# Patient Record
Sex: MALE | Race: BLACK OR AFRICAN AMERICAN | NOT HISPANIC OR LATINO | Employment: FULL TIME | URBAN - METROPOLITAN AREA
[De-identification: names, ages, dates, MRNs, and addresses within clinical notes are randomized per-mention and may not be internally consistent; named-entity substitution may affect disease eponyms.]

---

## 2017-09-30 ENCOUNTER — HOSPITAL ENCOUNTER (EMERGENCY)
Facility: HOSPITAL | Age: 42
Discharge: HOME | End: 2017-09-30
Attending: EMERGENCY MEDICINE

## 2017-09-30 VITALS
RESPIRATION RATE: 18 BRPM | BODY MASS INDEX: 29.02 KG/M2 | OXYGEN SATURATION: 100 % | WEIGHT: 170 LBS | HEART RATE: 71 BPM | TEMPERATURE: 98 F | HEIGHT: 64 IN | DIASTOLIC BLOOD PRESSURE: 76 MMHG | SYSTOLIC BLOOD PRESSURE: 146 MMHG

## 2017-09-30 DIAGNOSIS — N34.2 URETHRITIS: Primary | ICD-10-CM

## 2017-09-30 LAB
BACTERIA #/AREA URNS AUTO: ABNORMAL /HPF
BILIRUB UR QL STRIP: NEGATIVE
CLARITY UR REFRACT.AUTO: ABNORMAL
COLOR UR AUTO: ABNORMAL
GLUCOSE UR QL STRIP: NEGATIVE
HGB UR QL STRIP: ABNORMAL
KETONES UR QL STRIP: NEGATIVE
LEUKOCYTE ESTERASE UR QL STRIP: ABNORMAL
MICROSCOPIC COMMENT: ABNORMAL
NITRITE UR QL STRIP: NEGATIVE
PH UR STRIP: 6 [PH] (ref 5–8)
PROT UR QL STRIP: NEGATIVE
RBC #/AREA URNS AUTO: 7 /HPF (ref 0–4)
SP GR UR STRIP: 1.01 (ref 1–1.03)
URN SPEC COLLECT METH UR: ABNORMAL
UROBILINOGEN UR STRIP-ACNC: NEGATIVE EU/DL
WBC #/AREA URNS AUTO: 77 /HPF (ref 0–5)

## 2017-09-30 PROCEDURE — 96372 THER/PROPH/DIAG INJ SC/IM: CPT

## 2017-09-30 PROCEDURE — 87086 URINE CULTURE/COLONY COUNT: CPT

## 2017-09-30 PROCEDURE — 87088 URINE BACTERIA CULTURE: CPT

## 2017-09-30 PROCEDURE — 87077 CULTURE AEROBIC IDENTIFY: CPT

## 2017-09-30 PROCEDURE — 63600175 PHARM REV CODE 636 W HCPCS: Performed by: PHYSICIAN ASSISTANT

## 2017-09-30 PROCEDURE — 87186 SC STD MICRODIL/AGAR DIL: CPT

## 2017-09-30 PROCEDURE — 99283 EMERGENCY DEPT VISIT LOW MDM: CPT | Mod: ,,, | Performed by: EMERGENCY MEDICINE

## 2017-09-30 PROCEDURE — 81001 URINALYSIS AUTO W/SCOPE: CPT

## 2017-09-30 PROCEDURE — 99283 EMERGENCY DEPT VISIT LOW MDM: CPT | Mod: 25

## 2017-09-30 PROCEDURE — 87591 N.GONORRHOEAE DNA AMP PROB: CPT

## 2017-09-30 PROCEDURE — 25000003 PHARM REV CODE 250: Performed by: PHYSICIAN ASSISTANT

## 2017-09-30 RX ORDER — AZITHROMYCIN 250 MG/1
1000 TABLET, FILM COATED ORAL
Status: COMPLETED | OUTPATIENT
Start: 2017-09-30 | End: 2017-09-30

## 2017-09-30 RX ORDER — CEFTRIAXONE 500 MG/1
250 INJECTION, POWDER, FOR SOLUTION INTRAMUSCULAR; INTRAVENOUS
Status: COMPLETED | OUTPATIENT
Start: 2017-09-30 | End: 2017-09-30

## 2017-09-30 RX ADMIN — AZITHROMYCIN 1000 MG: 250 TABLET, FILM COATED ORAL at 04:09

## 2017-09-30 RX ADMIN — CEFTRIAXONE SODIUM 250 MG: 500 INJECTION, POWDER, FOR SOLUTION INTRAMUSCULAR; INTRAVENOUS at 04:09

## 2017-09-30 NOTE — ED PROVIDER NOTES
Encounter Date: 9/30/2017    SCRIBE #1 NOTE: I, Lucille Dent, am scribing for, and in the presence of,  ALONSO Miles. I have scribed the entire note.       History     Chief Complaint   Patient presents with    Exposure to STD     yellow pus leaking from penis x 1 week. Dysuria. Unprotected sex reported.     Time seen by provider: 3:12 PM    This is a 42 y.o. male with PM Hx of UTI who presents with complaint of penile discharge and painful urination x 1 week. He also endorses dysuria particularly after voiding, suprapubic pain, and pain at his right testicle specifically when coughing). No genital sores, fever, chills, nausea, vomiting, diarrhea, CP, SOB, or any other symptoms at this time. Unprotected sex recently but no new partner.         The history is provided by the patient and medical records.     Review of patient's allergies indicates:   Allergen Reactions    Penicillins Swelling     Past Medical History:   Diagnosis Date    UTI (urinary tract infection)      Past Surgical History:   Procedure Laterality Date    CYST REMOVAL      CYST REMOVAL       History reviewed. No pertinent family history.  Social History   Substance Use Topics    Smoking status: Current Every Day Smoker     Packs/day: 0.50     Types: Cigarettes    Smokeless tobacco: Never Used    Alcohol use Yes      Comment: on weekends     Review of Systems   Constitutional: Negative for chills and fever.   HENT: Negative for congestion.    Respiratory: Negative for shortness of breath.    Cardiovascular: Negative for chest pain.   Gastrointestinal: Positive for abdominal pain (suprapubic). Negative for diarrhea, nausea and vomiting.   Genitourinary: Positive for discharge, dysuria and testicular pain (right testicle when coughing).   Musculoskeletal: Negative for back pain.   Skin: Negative for rash.        Negative for genital sores.   Neurological: Negative for weakness and numbness.   Psychiatric/Behavioral: Negative for  confusion.       Physical Exam     Initial Vitals [09/30/17 1423]   BP Pulse Resp Temp SpO2   (!) 169/80 76 16 97.8 °F (36.6 °C) 100 %      MAP       109.67         Physical Exam    Nursing note and vitals reviewed.  Constitutional: He appears well-developed and well-nourished.  Non-toxic appearance. He does not appear ill. No distress.   HENT:   Head: Normocephalic and atraumatic.   Neck: Normal range of motion. Neck supple.   Cardiovascular: Normal rate and regular rhythm. Exam reveals no gallop, no distant heart sounds and no friction rub.    No murmur heard.  Pulmonary/Chest: Effort normal and breath sounds normal. No accessory muscle usage. No tachypnea. No respiratory distress. He has no decreased breath sounds. He has no wheezes. He has no rhonchi. He has no rales.   Abdominal: Soft. Normal appearance and bowel sounds are normal. He exhibits no distension. There is tenderness (suprapubic).   Genitourinary:   Genitourinary Comments: No drainage noted from the meatus, no sores, no testicular swelling or tenderness. Shoddy left inguinal lymph node that is non tender.    Musculoskeletal: Normal range of motion.   Neurological: He is alert and oriented to person, place, and time.   Skin: Skin is warm and dry. No rash noted. No pallor.   Psychiatric: He has a normal mood and affect. His behavior is normal. Judgment and thought content normal.         ED Course   Procedures  Labs Reviewed   URINALYSIS, REFLEX TO URINE CULTURE - Abnormal; Notable for the following:        Result Value    Appearance, UA Hazy (*)     Occult Blood UA 1+ (*)     Leukocytes, UA 3+ (*)     All other components within normal limits    Narrative:     [id number]  Add on UAR per Saundra Stewart PA-C @ 16:45  09/30/2017    URINALYSIS MICROSCOPIC - Abnormal; Notable for the following:     RBC, UA 7 (*)     WBC, UA 77 (*)     All other components within normal limits    Narrative:     [id number]  Add on UAR per Saundra Stewart PA-C @ 16:45   09/30/2017    C. TRACHOMATIS/N. GONORRHOEAE BY AMP DNA   CULTURE, URINE   URINALYSIS, REFLEX TO URINE CULTURE             Medical Decision Making:   History:   Old Medical Records: I decided to obtain old medical records.  Differential Diagnosis:   My differential diagnosis includes but is not limited to: UTI, urethritis, epididymitis, orchitis    Clinical Tests:   Lab Tests: Ordered and Reviewed       APC / Resident Notes:   41 yo male presents with penile discharge, pelvic pain, and dysuria for the past week. Hypertensive at 169/80. Vitals otherwise WNL.  exam unremarkable.     Will send UA and urine GCC culture. Will treat empirically with ceftriaxone and azithromycin.     5:28 PM  UA with 77 WBC's, 7 RBC's and occasional bacteria. Will discharge pt in stable condition. Advised PCP follow up in one week if no improvement. Return precautions given.     I have reviewed the patient's records and discussed this case with my supervising physician.         Scribe Attestation:   Scribe #1: I performed the above scribed service and the documentation accurately describes the services I performed. I attest to the accuracy of the note.    Attending Attestation:           Physician Attestation for Scribe:  Physician Attestation Statement for Scribe #1: I, Saundra Stewart, APC, reviewed documentation, as scribed by Lucille Dent in my presence, and it is both accurate and complete.                 ED Course      Clinical Impression:   The encounter diagnosis was Urethritis.    Disposition:   Disposition: Discharged  Condition: Stable                        Saundra Stewart PA-C  09/30/17 2020

## 2017-09-30 NOTE — ED TRIAGE NOTES
Pt states he has drainage from penis w/ pain and burning after urination and pain in testicles and scrotum and lower abdomen. Denies fever/ chills/ n/v. Onset  Pain 1 week ago.

## 2017-10-01 LAB
C TRACH DNA SPEC QL NAA+PROBE: NOT DETECTED
N GONORRHOEA DNA SPEC QL NAA+PROBE: NOT DETECTED

## 2017-10-02 LAB — BACTERIA UR CULT: NORMAL

## 2018-11-27 ENCOUNTER — HOSPITAL ENCOUNTER (EMERGENCY)
Facility: HOSPITAL | Age: 43
Discharge: HOME | End: 2018-11-27
Attending: EMERGENCY MEDICINE

## 2018-11-27 VITALS
HEART RATE: 77 BPM | BODY MASS INDEX: 29.18 KG/M2 | DIASTOLIC BLOOD PRESSURE: 90 MMHG | RESPIRATION RATE: 16 BRPM | OXYGEN SATURATION: 100 % | SYSTOLIC BLOOD PRESSURE: 146 MMHG | WEIGHT: 170 LBS | TEMPERATURE: 98 F

## 2018-11-27 DIAGNOSIS — M79.642 BILATERAL HAND PAIN: ICD-10-CM

## 2018-11-27 DIAGNOSIS — L02.811 SCALP ABSCESS: Primary | ICD-10-CM

## 2018-11-27 DIAGNOSIS — M79.641 BILATERAL HAND PAIN: ICD-10-CM

## 2018-11-27 DIAGNOSIS — H93.19 TINNITUS, UNSPECIFIED LATERALITY: ICD-10-CM

## 2018-11-27 PROCEDURE — 99284 EMERGENCY DEPT VISIT MOD MDM: CPT

## 2018-11-27 PROCEDURE — 99283 EMERGENCY DEPT VISIT LOW MDM: CPT | Mod: ,,, | Performed by: EMERGENCY MEDICINE

## 2018-11-27 RX ORDER — NAPROXEN 375 MG/1
375 TABLET ORAL 2 TIMES DAILY PRN
Qty: 20 TABLET | Refills: 0 | Status: ON HOLD | OUTPATIENT
Start: 2018-11-27 | End: 2019-09-11 | Stop reason: HOSPADM

## 2018-11-27 RX ORDER — SULFAMETHOXAZOLE AND TRIMETHOPRIM 800; 160 MG/1; MG/1
2 TABLET ORAL 2 TIMES DAILY
Qty: 28 TABLET | Refills: 0 | Status: SHIPPED | OUTPATIENT
Start: 2018-11-27 | End: 2018-12-04

## 2018-11-27 NOTE — ED PROVIDER NOTES
"Encounter Date: 11/27/2018    SCRIBE #1 NOTE: I, Nuria Larson, am scribing for, and in the presence of,  Dr. Aburto. I have scribed the entire note.       History     Chief Complaint   Patient presents with    Hearing Problem     "Hear a whining noise" X a month     Time patient was seen by the provider: 2:46 PM      The patient is a 43 y.o. male who presents to the ED with a complaint of hearing problem for a month. Patient states he hears a "quiet whining noise" when the room is quiet. 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 He also notes of hands vibrating and complains of bumps on right lower scalp that presented 2 weeks ago.        The history is provided by the patient and medical records.     Review of patient's allergies indicates:   Allergen Reactions    Penicillins Swelling     Scrotal swelling     Past Medical History:   Diagnosis Date    UTI (urinary tract infection)      Past Surgical History:   Procedure Laterality Date    CYST REMOVAL      CYST REMOVAL       History reviewed. No pertinent family history.  Social History     Tobacco Use    Smoking status: Current Every Day Smoker     Packs/day: 0.50     Types: Cigarettes    Smokeless tobacco: Never Used   Substance Use Topics    Alcohol use: Yes     Comment: on weekends    Drug use: Yes     Frequency: 1.0 times per week     Types: Marijuana, Methamphetamines     Comment: Ecstacy     Review of Systems  General: No fever.  No chills.  Ears: Hearing problem  Eyes: No visual changes.  Head: No headache. Bumps on scalp.   Integument: No rashes or lesions.  Chest: No shortness of breath.  Cardiovascular: No chest pain.  Abdomen: No abdominal pain.  No nausea or vomiting.  Urinary: No abnormal urination.  Neurologic: No focal weakness.  No numbness.  Hematologic: No easy bruising.  Endocrine: No excessive thirst or urination.    Physical Exam     Initial Vitals [11/27/18 1243]   BP Pulse Resp Temp SpO2   (!) 146/90 77 16 " 97.6 °F (36.4 °C) 100 %      MAP       --         Physical Exam    Nursing note and vitals reviewed.      Appearance: No acute distress.  Skin: No rashes seen.  Good turgor.  No abrasions.  No ecchymoses. Few pustulate bumps on right lower scalp.  Eyes: No conjunctival injection.  ENT: Oropharynx clear. Ears are clear, normal.    Chest: Clear to auscultation bilaterally.  Good air movement.  No wheezes.  No rhonchi.  Cardiovascular: Regular rate and rhythm.  No murmurs. No gallops. No rubs.  Abdomen: Soft.  Not distended.  Nontender.  No guarding.  No rebound.  Musculoskeletal: Good range of motion all joints.  No deformities.  Neck supple.  No meningismus.  Neurologic: Motor intact.  Sensation intact.  Cerebellar intact.  Cranial nerves intact.  Mental Status:  Alert and oriented x 3.  Appropriate, conversant.    ED Course   Procedures  Labs Reviewed - No data to display       Imaging Results    None          Medical Decision Making:   History:   Old Medical Records: I decided to obtain old medical records.  Initial Assessment:   43 y.o. Male patient with pustulant bumps on right lower scalp looks like possible bacterial infection. There is nothing worth draining. Will treat with bactrim, if it does not clear up he will need to be on antifungal medicine. This is not tinea. He states of bilateral hand vibrations though I do not feel his hands vibrating. Cuts on the finger tips are not infected or impressive at all. 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             Scribe Attestation:   Scribe #1: I performed the above scribed service and the documentation accurately describes the services I performed. I attest to the accuracy of the note.               Clinical Impression:   The primary encounter diagnosis was Scalp abscess. Diagnoses of Tinnitus, unspecified laterality and Bilateral hand pain were also pertinent to this visit.      Disposition:   Disposition:  Discharged  Condition: Stable                        Mg Aburto MD  11/27/18 6235

## 2018-11-27 NOTE — DISCHARGE INSTRUCTIONS
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

## 2018-11-27 NOTE — ED TRIAGE NOTES
Patient reports to the ED today with reports of hearing problems onset x a month ago. Patient states that it sounds like a whining noise in ears and he is having trouble sleeping at night states taking ecstasy to try and stay awake during the day . Patient reports tingling in fingers.

## 2019-09-09 ENCOUNTER — HOSPITAL ENCOUNTER (EMERGENCY)
Facility: HOSPITAL | Age: 44
Discharge: PSYCHIATRIC OR SUBSTANCE USE FACILITY | End: 2019-09-09
Attending: EMERGENCY MEDICINE

## 2019-09-09 VITALS
HEIGHT: 69 IN | WEIGHT: 160 LBS | DIASTOLIC BLOOD PRESSURE: 67 MMHG | RESPIRATION RATE: 16 BRPM | HEART RATE: 55 BPM | SYSTOLIC BLOOD PRESSURE: 115 MMHG | TEMPERATURE: 98 F | BODY MASS INDEX: 23.7 KG/M2 | OXYGEN SATURATION: 98 %

## 2019-09-09 DIAGNOSIS — F22 PARANOIA: Primary | ICD-10-CM

## 2019-09-09 DIAGNOSIS — R45.850 HOMICIDAL IDEATION: ICD-10-CM

## 2019-09-09 PROBLEM — F29 PSYCHOSIS: Status: ACTIVE | Noted: 2019-09-09

## 2019-09-09 LAB
ALBUMIN SERPL BCP-MCNC: 3.9 G/DL (ref 3.5–5.2)
ALP SERPL-CCNC: 95 U/L (ref 55–135)
ALT SERPL W/O P-5'-P-CCNC: 11 U/L (ref 10–44)
AMPHET+METHAMPHET UR QL: NORMAL
ANION GAP SERPL CALC-SCNC: 10 MMOL/L (ref 8–16)
APAP SERPL-MCNC: <3 UG/ML (ref 10–20)
AST SERPL-CCNC: 14 U/L (ref 10–40)
BACTERIA #/AREA URNS AUTO: NORMAL /HPF
BARBITURATES UR QL SCN>200 NG/ML: NEGATIVE
BASOPHILS # BLD AUTO: 0.06 K/UL (ref 0–0.2)
BASOPHILS NFR BLD: 0.7 % (ref 0–1.9)
BENZODIAZ UR QL SCN>200 NG/ML: NEGATIVE
BILIRUB SERPL-MCNC: 0.4 MG/DL (ref 0.1–1)
BILIRUB UR QL STRIP: NEGATIVE
BUN SERPL-MCNC: 11 MG/DL (ref 6–20)
BZE UR QL SCN: NEGATIVE
CALCIUM SERPL-MCNC: 9.3 MG/DL (ref 8.7–10.5)
CANNABINOIDS UR QL SCN: NORMAL
CHLORIDE SERPL-SCNC: 105 MMOL/L (ref 95–110)
CLARITY UR REFRACT.AUTO: CLEAR
CO2 SERPL-SCNC: 27 MMOL/L (ref 23–29)
COLOR UR AUTO: YELLOW
CREAT SERPL-MCNC: 1.1 MG/DL (ref 0.5–1.4)
CREAT UR-MCNC: 141 MG/DL (ref 23–375)
DIFFERENTIAL METHOD: ABNORMAL
EOSINOPHIL # BLD AUTO: 0.2 K/UL (ref 0–0.5)
EOSINOPHIL NFR BLD: 2.2 % (ref 0–8)
ERYTHROCYTE [DISTWIDTH] IN BLOOD BY AUTOMATED COUNT: 13 % (ref 11.5–14.5)
EST. GFR  (AFRICAN AMERICAN): >60 ML/MIN/1.73 M^2
EST. GFR  (NON AFRICAN AMERICAN): >60 ML/MIN/1.73 M^2
ETHANOL SERPL-MCNC: <10 MG/DL
GLUCOSE SERPL-MCNC: 105 MG/DL (ref 70–110)
GLUCOSE UR QL STRIP: NEGATIVE
HCT VFR BLD AUTO: 41.8 % (ref 40–54)
HGB BLD-MCNC: 14.2 G/DL (ref 14–18)
HGB UR QL STRIP: ABNORMAL
IMM GRANULOCYTES # BLD AUTO: 0.02 K/UL (ref 0–0.04)
IMM GRANULOCYTES NFR BLD AUTO: 0.2 % (ref 0–0.5)
KETONES UR QL STRIP: NEGATIVE
LEUKOCYTE ESTERASE UR QL STRIP: NEGATIVE
LYMPHOCYTES # BLD AUTO: 2.2 K/UL (ref 1–4.8)
LYMPHOCYTES NFR BLD: 24.8 % (ref 18–48)
MCH RBC QN AUTO: 32.2 PG (ref 27–31)
MCHC RBC AUTO-ENTMCNC: 34 G/DL (ref 32–36)
MCV RBC AUTO: 95 FL (ref 82–98)
METHADONE UR QL SCN>300 NG/ML: NEGATIVE
MICROSCOPIC COMMENT: NORMAL
MONOCYTES # BLD AUTO: 0.5 K/UL (ref 0.3–1)
MONOCYTES NFR BLD: 6 % (ref 4–15)
NEUTROPHILS # BLD AUTO: 5.9 K/UL (ref 1.8–7.7)
NEUTROPHILS NFR BLD: 66.1 % (ref 38–73)
NITRITE UR QL STRIP: NEGATIVE
NRBC BLD-RTO: 0 /100 WBC
OPIATES UR QL SCN: NEGATIVE
PCP UR QL SCN>25 NG/ML: NEGATIVE
PH UR STRIP: 6 [PH] (ref 5–8)
PLATELET # BLD AUTO: 214 K/UL (ref 150–350)
PMV BLD AUTO: 11.5 FL (ref 9.2–12.9)
POTASSIUM SERPL-SCNC: 3.5 MMOL/L (ref 3.5–5.1)
PROT SERPL-MCNC: 6.6 G/DL (ref 6–8.4)
PROT UR QL STRIP: NEGATIVE
RBC # BLD AUTO: 4.41 M/UL (ref 4.6–6.2)
RBC #/AREA URNS AUTO: 1 /HPF (ref 0–4)
SODIUM SERPL-SCNC: 142 MMOL/L (ref 136–145)
SP GR UR STRIP: 1.01 (ref 1–1.03)
SQUAMOUS #/AREA URNS AUTO: 0 /HPF
TOXICOLOGY INFORMATION: NORMAL
TSH SERPL DL<=0.005 MIU/L-ACNC: 0.95 UIU/ML (ref 0.4–4)
URN SPEC COLLECT METH UR: ABNORMAL
WBC # BLD AUTO: 8.96 K/UL (ref 3.9–12.7)
WBC #/AREA URNS AUTO: 3 /HPF (ref 0–5)

## 2019-09-09 PROCEDURE — 99284 PR EMERGENCY DEPT VISIT,LEVEL IV: ICD-10-PCS | Mod: ,,, | Performed by: PHYSICIAN ASSISTANT

## 2019-09-09 PROCEDURE — 80307 DRUG TEST PRSMV CHEM ANLYZR: CPT

## 2019-09-09 PROCEDURE — 80053 COMPREHEN METABOLIC PANEL: CPT

## 2019-09-09 PROCEDURE — 99284 EMERGENCY DEPT VISIT MOD MDM: CPT | Mod: ,,, | Performed by: PHYSICIAN ASSISTANT

## 2019-09-09 PROCEDURE — 80320 DRUG SCREEN QUANTALCOHOLS: CPT

## 2019-09-09 PROCEDURE — 80329 ANALGESICS NON-OPIOID 1 OR 2: CPT

## 2019-09-09 PROCEDURE — 99285 EMERGENCY DEPT VISIT HI MDM: CPT | Mod: 25

## 2019-09-09 PROCEDURE — 81001 URINALYSIS AUTO W/SCOPE: CPT

## 2019-09-09 PROCEDURE — 84443 ASSAY THYROID STIM HORMONE: CPT

## 2019-09-09 PROCEDURE — 85025 COMPLETE CBC W/AUTO DIFF WBC: CPT

## 2019-09-09 NOTE — ED NOTES
Pt transferred to  3, pt is clam and cooperative. Pt denies AVH's at this time he denies SI and would not answer questions regarding HI. Pt PEC called into the coroners office. Pt checked for contraband and his belongings secured. Pt given lunch tray.

## 2019-09-09 NOTE — ED NOTES
Pt transported to [facility] via wheel chiar with DANIELA Shen RN and  Pt  two security guards  Pt condition stable on leaving the ED, pt belongings given to DANIELA Tsai RN

## 2019-09-09 NOTE — ED TRIAGE NOTES
"Pt states " I think someone has been playing me for a long time"  Feels someone is putting something on his money , putting something in his food  And feels if he puts his hand near metal it vibrates Complains of a funny feeling from his neck down his back, states he will hear a noise and his stomach will start hurting , feels they put something in his food and he feels a vibration in his stomach  "

## 2019-09-09 NOTE — ED NOTES
Timoteo Brumfield is maintaining eye contact on pt and is charting on every 15 min observation form  Pt is resting quietly Resp full and reg

## 2019-09-09 NOTE — ED NOTES
Timoteo Brumfield is maintaining eye contact on pt and is charting on every 15 min observation form

## 2019-09-09 NOTE — ED NOTES
Pt escorted to the [employer] vehicle by staff and security. [employer]  given PEC and 2 bags of belongings. Pt remained calm and cooperative

## 2019-09-09 NOTE — ED PROVIDER NOTES
Encounter Date: 9/9/2019       History     Chief Complaint   Patient presents with    Psychiatric Evaluation     i want to hurt somebody     44-year-old  male with no medical history presents to the ED for a psych evaluation.  He states for awhile he has been feeling as though someone is putting something on his money and his clothes and putting something in his food.  He will try to pick at his skin on his abdomen and his arms to get whatever this is out.  He also reports that whenever he gets close to metal he hears beep in his head and that his hand in his foot will vibrate.  He has been trying to figure out what was going on with him at home but states that he is very distraught and cannot function like this anymore.  He thinks that he knows the person that is doing this and has homicidal ideation towards them specifically.  He denies any plan and does not have access to a gun.  He denies any psych history and is not followed by psychiatrist.  He denies fever, chills, chest pain, shortness of breath, nausea, vomiting. He smokes 1 pack per day, denies alcohol use, smokes marijuana daily, recreationally uses ecstasy (last use Friday).    The history is provided by the patient.     Review of patient's allergies indicates:   Allergen Reactions    Penicillins Swelling     Scrotal swelling     Past Medical History:   Diagnosis Date    UTI (urinary tract infection)      Past Surgical History:   Procedure Laterality Date    CYST REMOVAL      CYST REMOVAL       Family History   Problem Relation Age of Onset    Hypertension Mother     Cancer Father      Social History     Tobacco Use    Smoking status: Current Every Day Smoker     Packs/day: 0.50     Types: Cigarettes    Smokeless tobacco: Never Used   Substance Use Topics    Alcohol use: Yes     Comment: on weekends    Drug use: Yes     Frequency: 1.0 times per week     Types: Marijuana, Methamphetamines     Comment: Ecstacy     Review of  Systems   Constitutional: Negative for chills and fever.   HENT: Negative for congestion, rhinorrhea and sore throat.    Eyes: Negative for photophobia and visual disturbance.   Respiratory: Negative for shortness of breath.    Cardiovascular: Negative for chest pain.   Gastrointestinal: Positive for abdominal pain. Negative for constipation, diarrhea, nausea and vomiting.   Genitourinary: Negative for dysuria and hematuria.   Musculoskeletal: Positive for myalgias.   Skin: Positive for wound.   Neurological: Positive for headaches. Negative for dizziness, weakness, light-headedness and numbness.   Psychiatric/Behavioral: Positive for hallucinations. Negative for confusion and suicidal ideas.        +homicidal ideation  +paranoia       Physical Exam     Initial Vitals [09/09/19 0737]   BP Pulse Resp Temp SpO2   (!) 183/99 103 18 97.6 °F (36.4 °C) 100 %      MAP       --         Physical Exam    Nursing note and vitals reviewed.  Constitutional: He appears well-developed and well-nourished. He is not diaphoretic. No distress.   HENT:   Head: Normocephalic.   Multiple scarred lesions to the face from skin picking. 2 swollen areas, no fluctuance or cellulitis.    Neck: Normal range of motion. Neck supple.   Cardiovascular: Regular rhythm and normal heart sounds. Tachycardia present.  Exam reveals no gallop and no friction rub.    No murmur heard.  Pulmonary/Chest: Breath sounds normal. He has no wheezes. He has no rhonchi. He has no rales.   Abdominal: Soft. Bowel sounds are normal. There is no tenderness. There is no rebound and no guarding.   Musculoskeletal: Normal range of motion.   Neurological: He is alert and oriented to person, place, and time.   Skin: Skin is warm and dry.   scarring noted to the bilateral arms, abdomen, face from skin picking   Psychiatric: He has a normal mood and affect.         ED Course   Procedures  Labs Reviewed   CBC W/ AUTO DIFFERENTIAL - Abnormal; Notable for the following  components:       Result Value    RBC 4.41 (*)     Mean Corpuscular Hemoglobin 32.2 (*)     All other components within normal limits   URINALYSIS, REFLEX TO URINE CULTURE - Abnormal; Notable for the following components:    Occult Blood UA 1+ (*)     All other components within normal limits    Narrative:     Preferred Collection Type->Urine, Clean Catch   ACETAMINOPHEN LEVEL - Abnormal; Notable for the following components:    Acetaminophen (Tylenol), Serum <3.0 (*)     All other components within normal limits   COMPREHENSIVE METABOLIC PANEL   TSH   DRUG SCREEN PANEL, URINE EMERGENCY    Narrative:     Preferred Collection Type->Urine, Clean Catch   ALCOHOL,MEDICAL (ETHANOL)   URINALYSIS MICROSCOPIC    Narrative:     Preferred Collection Type->Urine, Clean Catch          Imaging Results    None          Medical Decision Making:   History:   Old Medical Records: I decided to obtain old medical records.  Clinical Tests:   Lab Tests: Reviewed and Ordered       APC / Resident Notes:   44-year-old  male with no medical history presents to the ED for a psych evaluation.  Tachycardic.  Avoid making eye contact.  Abdomen is soft and nontender.  There are multiple scars noted to the bilateral arms, abdomen, and face from skin picking.  Two areas noted to the face that are swollen without fluctuance or cellulitis.  Patient reports paranoia and delusions.  No psych history. +HI. Will obtain psych screening labs.    PEC signed by Dr Painter.     Labs unremarkable. UDS positive for amphetamines and THC. Patient is medically clear and stable for inpatient psych eval/placement.          Attending Attestation:     Physician Attestation Statement for NP/PA:   I discussed this assessment and plan of this patient with the NP/PA, but I did not personally examine the patient. The face to face encounter was performed by the NP/PA.                     Clinical Impression:       ICD-10-CM ICD-9-CM   1. Paranoia F22 297.1    2. Homicidal ideation R45.850 V62.85         Disposition:   Disposition: Transferred  Condition: Stable  Psych                        Katia Kearns PA-C  09/09/19 0928

## 2019-09-09 NOTE — ED NOTES
Timoteo Brumfield is maintaining eye contact on pt and is charting on every 15 min observation form  Pt resting quietly at present  Resp full and reg

## 2019-09-09 NOTE — ED NOTES
Pt identifiers Manan Granda were checked and arecorrect  LOC: The patient is awake, alert, aware of environment with an appropriate affect. Oriented x4, speaking appropriately  APPEARANCE: Pt resting comfortably, in no acute distress, pt is clean and well groomed, clothing properly fastened  SKIN: Skin warm, dry and intact, normal skin turgor, moist mucus membranes Rash on chest and abd  Pt states he has been scratching his chest and abd  RESPIRATORY: Airway is open and patent, respirations are spontaneous, even and unlabored, normal effort and rate Breath sounds clear kenny to all lung fields on auscultation  CARDIAC: Normal rate and rhythm, no peripheral edema noted, capillary refill < 3 seconds, bilateral radial pulses 2+  ABDOMEN: Soft, nontender, nondistended. Bowel sounds present to all four quad of abd on auscultation  NEUROLOGIC: PERRL, facial expression is symmetrical, patient moving all extremities spontaneously, normal sensation in all extremities when touched with a finger.  Follows all commands appropriately  MUSCULOSKELETAL: No obvious deformities.

## 2019-09-09 NOTE — ED NOTES
Timoteo Brumfield is maintaining eye contact with pt and is charting on every 15 min observation form

## 2019-09-09 NOTE — ED NOTES
Pt report called to [facility]. Pt informed of his pending transfer he has no questions at this time.

## 2020-05-03 ENCOUNTER — HOSPITAL ENCOUNTER (EMERGENCY)
Facility: HOSPITAL | Age: 45
Discharge: HOME | End: 2020-05-03
Attending: EMERGENCY MEDICINE
Payer: MEDICAID

## 2020-05-03 VITALS
HEIGHT: 66 IN | DIASTOLIC BLOOD PRESSURE: 95 MMHG | SYSTOLIC BLOOD PRESSURE: 164 MMHG | TEMPERATURE: 98 F | HEART RATE: 90 BPM | BODY MASS INDEX: 24.11 KG/M2 | RESPIRATION RATE: 16 BRPM | OXYGEN SATURATION: 99 % | WEIGHT: 150 LBS

## 2020-05-03 DIAGNOSIS — R10.84 GENERALIZED ABDOMINAL PAIN: Primary | ICD-10-CM

## 2020-05-03 LAB
ALBUMIN SERPL BCP-MCNC: 4.2 G/DL (ref 3.5–5.2)
ALP SERPL-CCNC: 101 U/L (ref 55–135)
ALT SERPL W/O P-5'-P-CCNC: 13 U/L (ref 10–44)
ANION GAP SERPL CALC-SCNC: 11 MMOL/L (ref 8–16)
AST SERPL-CCNC: 15 U/L (ref 10–40)
BASOPHILS # BLD AUTO: 0.08 K/UL (ref 0–0.2)
BASOPHILS NFR BLD: 0.8 % (ref 0–1.9)
BILIRUB SERPL-MCNC: 0.6 MG/DL (ref 0.1–1)
BILIRUB UR QL STRIP: NEGATIVE
BUN SERPL-MCNC: 12 MG/DL (ref 6–20)
BUN SERPL-MCNC: 13 MG/DL (ref 6–30)
CALCIUM SERPL-MCNC: 9.6 MG/DL (ref 8.7–10.5)
CHLORIDE SERPL-SCNC: 102 MMOL/L (ref 95–110)
CHLORIDE SERPL-SCNC: 99 MMOL/L (ref 95–110)
CLARITY UR REFRACT.AUTO: CLEAR
CO2 SERPL-SCNC: 25 MMOL/L (ref 23–29)
COLOR UR AUTO: YELLOW
CREAT SERPL-MCNC: 1.1 MG/DL (ref 0.5–1.4)
CREAT SERPL-MCNC: 1.2 MG/DL (ref 0.5–1.4)
DIFFERENTIAL METHOD: ABNORMAL
EOSINOPHIL # BLD AUTO: 0.2 K/UL (ref 0–0.5)
EOSINOPHIL NFR BLD: 2.3 % (ref 0–8)
ERYTHROCYTE [DISTWIDTH] IN BLOOD BY AUTOMATED COUNT: 13.4 % (ref 11.5–14.5)
EST. GFR  (AFRICAN AMERICAN): >60 ML/MIN/1.73 M^2
EST. GFR  (NON AFRICAN AMERICAN): >60 ML/MIN/1.73 M^2
GLUCOSE SERPL-MCNC: 134 MG/DL (ref 70–110)
GLUCOSE SERPL-MCNC: 139 MG/DL (ref 70–110)
GLUCOSE UR QL STRIP: NEGATIVE
HCT VFR BLD AUTO: 45.3 % (ref 40–54)
HCT VFR BLD CALC: 47 %PCV (ref 36–54)
HGB BLD-MCNC: 15.6 G/DL (ref 14–18)
HGB UR QL STRIP: ABNORMAL
HYALINE CASTS UR QL AUTO: 1 /LPF
IMM GRANULOCYTES # BLD AUTO: 0.02 K/UL (ref 0–0.04)
IMM GRANULOCYTES NFR BLD AUTO: 0.2 % (ref 0–0.5)
KETONES UR QL STRIP: NEGATIVE
LEUKOCYTE ESTERASE UR QL STRIP: ABNORMAL
LIPASE SERPL-CCNC: 46 U/L (ref 4–60)
LYMPHOCYTES # BLD AUTO: 2.9 K/UL (ref 1–4.8)
LYMPHOCYTES NFR BLD: 28.5 % (ref 18–48)
MCH RBC QN AUTO: 31.9 PG (ref 27–31)
MCHC RBC AUTO-ENTMCNC: 34.4 G/DL (ref 32–36)
MCV RBC AUTO: 93 FL (ref 82–98)
MICROSCOPIC COMMENT: NORMAL
MONOCYTES # BLD AUTO: 0.8 K/UL (ref 0.3–1)
MONOCYTES NFR BLD: 7.9 % (ref 4–15)
NEUTROPHILS # BLD AUTO: 6 K/UL (ref 1.8–7.7)
NEUTROPHILS NFR BLD: 60.3 % (ref 38–73)
NITRITE UR QL STRIP: NEGATIVE
NRBC BLD-RTO: 0 /100 WBC
PH UR STRIP: 5 [PH] (ref 5–8)
PLATELET # BLD AUTO: 204 K/UL (ref 150–350)
PMV BLD AUTO: 11.7 FL (ref 9.2–12.9)
POC IONIZED CALCIUM: 1.19 MMOL/L (ref 1.06–1.42)
POC TCO2 (MEASURED): 25 MMOL/L (ref 23–29)
POTASSIUM BLD-SCNC: 3.7 MMOL/L (ref 3.5–5.1)
POTASSIUM SERPL-SCNC: 3.9 MMOL/L (ref 3.5–5.1)
PROT SERPL-MCNC: 7.4 G/DL (ref 6–8.4)
PROT UR QL STRIP: NEGATIVE
RBC # BLD AUTO: 4.89 M/UL (ref 4.6–6.2)
RBC #/AREA URNS AUTO: 1 /HPF (ref 0–4)
SAMPLE: ABNORMAL
SODIUM BLD-SCNC: 138 MMOL/L (ref 136–145)
SODIUM SERPL-SCNC: 138 MMOL/L (ref 136–145)
SP GR UR STRIP: 1.01 (ref 1–1.03)
SQUAMOUS #/AREA URNS AUTO: 0 /HPF
URN SPEC COLLECT METH UR: ABNORMAL
WBC # BLD AUTO: 9.99 K/UL (ref 3.9–12.7)
WBC #/AREA URNS AUTO: 5 /HPF (ref 0–5)

## 2020-05-03 PROCEDURE — 99285 EMERGENCY DEPT VISIT HI MDM: CPT | Mod: 25

## 2020-05-03 PROCEDURE — 93010 EKG 12-LEAD: ICD-10-PCS | Mod: ,,, | Performed by: INTERNAL MEDICINE

## 2020-05-03 PROCEDURE — 93010 ELECTROCARDIOGRAM REPORT: CPT | Mod: ,,, | Performed by: INTERNAL MEDICINE

## 2020-05-03 PROCEDURE — 96361 HYDRATE IV INFUSION ADD-ON: CPT

## 2020-05-03 PROCEDURE — 96375 TX/PRO/DX INJ NEW DRUG ADDON: CPT

## 2020-05-03 PROCEDURE — 80053 COMPREHEN METABOLIC PANEL: CPT

## 2020-05-03 PROCEDURE — 80047 BASIC METABLC PNL IONIZED CA: CPT

## 2020-05-03 PROCEDURE — 96374 THER/PROPH/DIAG INJ IV PUSH: CPT | Mod: 59

## 2020-05-03 PROCEDURE — 83690 ASSAY OF LIPASE: CPT

## 2020-05-03 PROCEDURE — 81001 URINALYSIS AUTO W/SCOPE: CPT

## 2020-05-03 PROCEDURE — 63600175 PHARM REV CODE 636 W HCPCS: Performed by: PHYSICIAN ASSISTANT

## 2020-05-03 PROCEDURE — 99285 PR EMERGENCY DEPT VISIT,LEVEL V: ICD-10-PCS | Mod: ,,, | Performed by: PHYSICIAN ASSISTANT

## 2020-05-03 PROCEDURE — 99285 EMERGENCY DEPT VISIT HI MDM: CPT | Mod: ,,, | Performed by: PHYSICIAN ASSISTANT

## 2020-05-03 PROCEDURE — 25000003 PHARM REV CODE 250: Performed by: PHYSICIAN ASSISTANT

## 2020-05-03 PROCEDURE — S0028 INJECTION, FAMOTIDINE, 20 MG: HCPCS | Performed by: PHYSICIAN ASSISTANT

## 2020-05-03 PROCEDURE — 25500020 PHARM REV CODE 255: Performed by: EMERGENCY MEDICINE

## 2020-05-03 PROCEDURE — 85025 COMPLETE CBC W/AUTO DIFF WBC: CPT

## 2020-05-03 PROCEDURE — 93005 ELECTROCARDIOGRAM TRACING: CPT

## 2020-05-03 RX ORDER — ONDANSETRON 2 MG/ML
4 INJECTION INTRAMUSCULAR; INTRAVENOUS
Status: COMPLETED | OUTPATIENT
Start: 2020-05-03 | End: 2020-05-03

## 2020-05-03 RX ORDER — FAMOTIDINE 10 MG/ML
20 INJECTION INTRAVENOUS
Status: COMPLETED | OUTPATIENT
Start: 2020-05-03 | End: 2020-05-03

## 2020-05-03 RX ORDER — MAG HYDROX/ALUMINUM HYD/SIMETH 200-200-20
30 SUSPENSION, ORAL (FINAL DOSE FORM) ORAL
Status: COMPLETED | OUTPATIENT
Start: 2020-05-03 | End: 2020-05-03

## 2020-05-03 RX ADMIN — ONDANSETRON 4 MG: 2 INJECTION, SOLUTION INTRAMUSCULAR; INTRAVENOUS at 04:05

## 2020-05-03 RX ADMIN — IOHEXOL 75 ML: 350 INJECTION, SOLUTION INTRAVENOUS at 05:05

## 2020-05-03 RX ADMIN — SODIUM CHLORIDE 1000 ML: 0.9 INJECTION, SOLUTION INTRAVENOUS at 04:05

## 2020-05-03 RX ADMIN — FAMOTIDINE 20 MG: 10 INJECTION INTRAVENOUS at 04:05

## 2020-05-03 RX ADMIN — ALUMINUM HYDROXIDE, MAGNESIUM HYDROXIDE, AND SIMETHICONE 30 ML: 200; 200; 20 SUSPENSION ORAL at 05:05

## 2020-05-03 NOTE — ED TRIAGE NOTES
"Patient complaining of gastric pain with associated nausea. Patient is belching. "My stomach is spinning." Believes to have food poisoning from bad grits. Denies CP, SOB, cough, fever, v/d.   "

## 2020-05-03 NOTE — DISCHARGE INSTRUCTIONS
Please return to the Emergency Department immediately for any new or concerning symptoms or if they get worse.   If you were prescribed antibiotics, please take them to completion.   If you were prescribed a narcotic medication, do not drive or operate heavy equipment or machinery, while taking these medications.  Please follow up with your primary care doctor or specialist in one week.  If you smoke, please stop smoking.    Our goal in the emergency department is to always give you outstanding care and exceptional service. You may receive a survey by mail or e-mail in the next week regarding your experience in our ED. We would greatly appreciate your completing and returning the survey. Your feedback provides us with a way to recognize our staff who give very good care and it helps us learn how to improve when your experience was below our aspiration of excellence.

## 2020-05-03 NOTE — ED PROVIDER NOTES
Encounter Date: 5/3/2020       History     Chief Complaint   Patient presents with    Abdominal Pain     Pt c/o of epigastric pain times 2-3 days, worsened this morning. Denies N/V/D. Denies covid symptoms.      Patient is a 45 year old male with no significant PMHx. He presents to the ED for abdominal pain. He reports having generalized abdominal pain for approximately one week. Describes pain as constant and aching. Rates pain 6/10. Denies OTC medication use. Reports associated nausea. Denies abdominal surgical hx. Unsure of last BM. + flatus. He denies fever,chills, vomiting, sob, chest pain, dysuria, diarrhea, or constipation. He is a current everyday smoker and reports alcohol use. Reports marijuana and ecstasy use. IVDU.    The history is provided by the patient and medical records. No  was used.     Review of patient's allergies indicates:   Allergen Reactions    Penicillins Swelling     Scrotal swelling     Past Medical History:   Diagnosis Date    UTI (urinary tract infection)      Past Surgical History:   Procedure Laterality Date    CYST REMOVAL      CYST REMOVAL       Family History   Problem Relation Age of Onset    Hypertension Mother     Cancer Father      Social History     Tobacco Use    Smoking status: Current Every Day Smoker     Packs/day: 0.50     Types: Cigarettes    Smokeless tobacco: Never Used   Substance Use Topics    Alcohol use: Yes     Comment: on weekends    Drug use: Yes     Frequency: 1.0 times per week     Types: Marijuana, Methamphetamines     Comment: Ecstacy     Review of Systems   Constitutional: Negative for fever.   HENT: Negative for sore throat.    Respiratory: Negative for shortness of breath.    Cardiovascular: Negative for chest pain.   Gastrointestinal: Positive for abdominal pain and nausea. Negative for vomiting.   Genitourinary: Negative for dysuria.   Musculoskeletal: Negative for back pain.   Skin: Negative for rash.   Neurological:  Negative for weakness.   Hematological: Does not bruise/bleed easily.       Physical Exam     Initial Vitals [05/03/20 0412]   BP Pulse Resp Temp SpO2   (!) 164/95 90 16 97.6 °F (36.4 °C) 99 %      MAP       --         Physical Exam    Vitals reviewed.  Constitutional: He appears well-developed and well-nourished. No distress.   HENT:   Head: Normocephalic.   Eyes: Conjunctivae are normal.   Neck: Normal range of motion.   Cardiovascular: Normal rate and regular rhythm.   No murmur heard.  Pulmonary/Chest: Breath sounds normal. No respiratory distress. He has no wheezes. He has no rales.   Abdominal: Soft. Bowel sounds are normal. He exhibits no distension. There is no tenderness.   Musculoskeletal: Normal range of motion.   Neurological: He is alert and oriented to person, place, and time.   Skin: Skin is warm and dry. No erythema.   Track marks noted to b/l UEs         ED Course   Procedures  Labs Reviewed   CBC W/ AUTO DIFFERENTIAL - Abnormal; Notable for the following components:       Result Value    Mean Corpuscular Hemoglobin 31.9 (*)     All other components within normal limits   COMPREHENSIVE METABOLIC PANEL - Abnormal; Notable for the following components:    Glucose 134 (*)     All other components within normal limits   URINALYSIS, REFLEX TO URINE CULTURE - Abnormal; Notable for the following components:    Occult Blood UA 1+ (*)     Leukocytes, UA Trace (*)     All other components within normal limits    Narrative:     Preferred Collection Type->Urine, Clean Catch   ISTAT PROCEDURE - Abnormal; Notable for the following components:    POC Glucose 139 (*)     All other components within normal limits   LIPASE   URINALYSIS MICROSCOPIC    Narrative:     Preferred Collection Type->Urine, Clean Catch          Imaging Results          CT Abdomen Pelvis With Contrast (Final result)  Result time 05/03/20 05:39:15    Final result by Gayle Christiansen MD (05/03/20 05:39:15)                 Impression:      No  acute abdominopelvic abnormality.    Fluid within the distal esophagus with slight wall thickening, which can be seen with dysmotility and/or reflux.  Clinical correlation advised.    Several subcentimeter hepatic hypodensities, too small to characterize.    Additional incidental findings, as above.    Electronically signed by resident: Yenifer Ryan  Date:    05/03/2020  Time:    05:11    Electronically signed by: Gayle Christiansen MD  Date:    05/03/2020  Time:    05:39             Narrative:    EXAMINATION:  CT ABDOMEN PELVIS WITH CONTRAST    CLINICAL HISTORY:  Abd pain, fever, abscess suspected;    TECHNIQUE:  Low dose axial images were obtained from the lung bases to the pubic symphysis following the intravenous administration of  of Omnipaque 350.  Sagittal and coronal reformats were provided.  Evaluation of the intra-abdominal structures is slightly limited due to paucity of intra-abdominal fat.    COMPARISON:  CT renal stone study 07/16/2016.    FINDINGS:  Evaluation of the intra-abdominal structures is slightly limited due to paucity of intra-abdominal fat.    Heart: Normal in size.  No pericardial effusion.    Lung bases: Symmetrically expanded.  No focal consolidation or pleural fluid.    Liver: Normal in size and attenuation.  Several subcentimeter hypodensities scattered throughout the right hepatic lobe, too small to characterize.  Portal vein is patent.    Gallbladder: Unremarkable without evidence of radiopaque stone.    Bile Ducts: No intra or extrahepatic biliary ductal dilation.    Pancreas: Unremarkable.  No significant peripancreatic inflammatory change or fat stranding.    Spleen: Unremarkable.    Adrenals: Unremarkable.    Kidneys/ Ureters: Normal in size and location.  Kidneys enhance symmetrically.  Punctate left renal hypodensity within the periphery of the inferior pole, too small to characterize.  No hydronephrosis or nephrolithiasis.  The ureters are difficult to follow entirely to the  bladder but no obvious stones along their expected course.  Small pelvic phleboliths present.    Bladder: Smooth contours without bladder wall thickening.    Reproductive organs: Unremarkable.    GI Tract/Mesentery: Fluid layering within the visualized distal esophagus slight esophageal wall thickening, which can be seen with dysmotility and/or reflux.  Stomach is unremarkable in appearance.  Visualized loops of small and large bowel are normal in caliber without evidence for obstruction or inflammation. The appendix is visualized and is unremarkable.    Peritoneal Space: No abdominopelvic ascites, intraperitoneal free air, or significant adenopathy.    Retroperitoneum: No significant adenopathy.    Abdominal wall/extraperitoneal soft tissues: Unremarkable.    Vasculature: Abdominal aorta is normal in caliber, contour, and course without significant calcific atherosclerosis.  Mild atherosclerotic plaque involving bilateral proximal common iliac arteries, similar to prior exam.    Bones: Unremarkable without acute fracture or bone destructive process.                                 Medical Decision Making:   History:   Old Medical Records: I decided to obtain old medical records.  Clinical Tests:   Lab Tests: Ordered and Reviewed  Radiological Study: Ordered and Reviewed  Medical Tests: Ordered and Reviewed       APC / Resident Notes:   Patient is a 45 year old male presents to the ED for emergent evaluation of generalized abdominal pain.     Will order labs and imaging. Will order pain medication for symptomatic relief. Will continue to monitor.     Differential diagnoses include, but are not limited to: colitis, pancreatitis, gastroenteritis, cardiac arrhythmia, or electrolyte imbalance.     No leukocytosis. Hemodynamically stable. UA unremarkable for infectious process. CT abdomen pelvis found to have No acute abdominopelvic abnormality.    Patient reassessed, reports symptoms improved with ED management. Patient  tolerating PO without difficulty or emesis. I have discussed emergency department findings, and plan with the patient. Will discharge home with F/U with PCP in approximately one week. Additional verbal discharge instructions were given and discussed with the patient. Patient verbalizes understanding of plan and agrees. Return precautions given.    I have discussed and reviewed with my supervising physician.        Clinical Impression:       ICD-10-CM ICD-9-CM   1. Generalized abdominal pain R10.84 789.07         Disposition:   Disposition: Discharged  Condition: Stable     ED Disposition Condition    Discharge Stable        ED Prescriptions     None        Follow-up Information     Follow up With Specialties Details Why Contact Info    [facility]  Schedule an appointment as soon as possible for a visit   [address] [facility] [address]  [phone]                                       Mercedes Chao PA-C  05/03/20 0816

## 2020-05-12 ENCOUNTER — HOSPITAL ENCOUNTER (EMERGENCY)
Facility: HOSPITAL | Age: 45
Discharge: HOME | End: 2020-05-12
Attending: EMERGENCY MEDICINE
Payer: MEDICAID

## 2020-05-12 VITALS
OXYGEN SATURATION: 100 % | DIASTOLIC BLOOD PRESSURE: 98 MMHG | SYSTOLIC BLOOD PRESSURE: 149 MMHG | HEIGHT: 66 IN | HEART RATE: 91 BPM | RESPIRATION RATE: 18 BRPM | WEIGHT: 160 LBS | TEMPERATURE: 98 F | BODY MASS INDEX: 25.71 KG/M2

## 2020-05-12 DIAGNOSIS — L02.91 ABSCESS: Primary | ICD-10-CM

## 2020-05-12 PROCEDURE — 99284 EMERGENCY DEPT VISIT MOD MDM: CPT | Mod: 25,,, | Performed by: EMERGENCY MEDICINE

## 2020-05-12 PROCEDURE — 96372 THER/PROPH/DIAG INJ SC/IM: CPT

## 2020-05-12 PROCEDURE — 10060 I&D ABSCESS SIMPLE/SINGLE: CPT | Mod: ,,, | Performed by: EMERGENCY MEDICINE

## 2020-05-12 PROCEDURE — 99284 EMERGENCY DEPT VISIT MOD MDM: CPT | Mod: 25

## 2020-05-12 PROCEDURE — 10060 I&D ABSCESS SIMPLE/SINGLE: CPT

## 2020-05-12 PROCEDURE — 10060 PR DRAIN SKIN ABSCESS SIMPLE: ICD-10-PCS | Mod: ,,, | Performed by: EMERGENCY MEDICINE

## 2020-05-12 PROCEDURE — 63600175 PHARM REV CODE 636 W HCPCS: Performed by: PHYSICIAN ASSISTANT

## 2020-05-12 PROCEDURE — 25000003 PHARM REV CODE 250: Performed by: PHYSICIAN ASSISTANT

## 2020-05-12 PROCEDURE — 99284 PR EMERGENCY DEPT VISIT,LEVEL IV: ICD-10-PCS | Mod: 25,,, | Performed by: EMERGENCY MEDICINE

## 2020-05-12 RX ORDER — KETOROLAC TROMETHAMINE 30 MG/ML
15 INJECTION, SOLUTION INTRAMUSCULAR; INTRAVENOUS
Status: COMPLETED | OUTPATIENT
Start: 2020-05-12 | End: 2020-05-12

## 2020-05-12 RX ORDER — IBUPROFEN 600 MG/1
600 TABLET ORAL
Status: ON HOLD | COMMUNITY
Start: 2020-05-09 | End: 2020-12-21 | Stop reason: HOSPADM

## 2020-05-12 RX ORDER — ACETAMINOPHEN 500 MG
1000 TABLET ORAL
Status: COMPLETED | OUTPATIENT
Start: 2020-05-12 | End: 2020-05-12

## 2020-05-12 RX ORDER — SULFAMETHOXAZOLE AND TRIMETHOPRIM 800; 160 MG/1; MG/1
1 TABLET ORAL 2 TIMES DAILY
Qty: 14 TABLET | Refills: 0 | Status: SHIPPED | OUTPATIENT
Start: 2020-05-12 | End: 2020-05-19

## 2020-05-12 RX ORDER — LIDOCAINE HYDROCHLORIDE AND EPINEPHRINE 10; 10 MG/ML; UG/ML
10 INJECTION, SOLUTION INFILTRATION; PERINEURAL
Status: COMPLETED | OUTPATIENT
Start: 2020-05-12 | End: 2020-05-12

## 2020-05-12 RX ORDER — NAPROXEN 500 MG/1
500 TABLET ORAL 2 TIMES DAILY WITH MEALS
Qty: 30 TABLET | Refills: 0 | Status: ON HOLD | OUTPATIENT
Start: 2020-05-12 | End: 2020-12-21 | Stop reason: HOSPADM

## 2020-05-12 RX ORDER — BUTALBITAL, ACETAMINOPHEN AND CAFFEINE 50; 325; 40 MG/1; MG/1; MG/1
1 TABLET ORAL
COMMUNITY
Start: 2020-05-09 | End: 2020-06-08

## 2020-05-12 RX ADMIN — KETOROLAC TROMETHAMINE 15 MG: 30 INJECTION, SOLUTION INTRAMUSCULAR at 01:05

## 2020-05-12 RX ADMIN — LIDOCAINE HYDROCHLORIDE AND EPINEPHRINE 10 ML: 10; 10 INJECTION, SOLUTION INFILTRATION; PERINEURAL at 01:05

## 2020-05-12 RX ADMIN — ACETAMINOPHEN 1000 MG: 500 TABLET ORAL at 01:05

## 2020-05-12 NOTE — ED NOTES
Patient identifiers verified and correct for Mr Granda  C/C: ABdcess to top right head SEE NN  APPEARANCE: awake and alert in NAD.  SKIN: warm, dry Small area to right mid head, min surrounding redness or swelling  MUSCULOSKELETAL: Patient moving all extremities spontaneously, no obvious swelling or deformities noted. Ambulates independently.  RESPIRATORY: Denies shortness of breath.Respirations unlabored. Denies fevers  CARDIAC: Denies CP, 2+ distal pulses; no peripheral edema  ABDOMEN: S/ND/NT, Denies nausea  : voids spontaneously, denies difficulty  Neurologic: AAO x 4; follows commands equal strength in all extremities; denies numbness/tingling. Denies dizziness  Denies weakness

## 2020-05-12 NOTE — ED NOTES
Discharge home, states understanding to follow up as directed. Ambulates out of ED without difficulty. Dressing applied, discharge  instructions per PA

## 2020-05-12 NOTE — PROVIDER PROGRESS NOTES - EMERGENCY DEPT.
Emergency Department TeleTRIAGE Encounter Note      CHIEF COMPLAINT    Chief Complaint   Patient presents with    Abscess     top of head , no drainage       VITAL SIGNS   Initial Vitals [05/12/20 1252]   BP Pulse Resp Temp SpO2   (!) 149/98 91 18 97.6 °F (36.4 °C) 100 %      MAP       --            ALLERGIES    Review of patient's allergies indicates:   Allergen Reactions    Penicillins Swelling     Scrotal swelling       PROVIDER TRIAGE NOTE  Patient with no significant past medical history presents for evaluation of possible abscess.  Patient reports not to the top of his scalp that has been increasing in size over the past week.  He denies any drainage from the site.  He has tried witch Hazel and hot compresses without relief.  He has had cysts removed before and believes this may be a cyst.      ORDERS  Labs Reviewed - No data to display    ED Orders (720h ago, onward)    None            Virtual Visit Note: The provider triage portion of this emergency department evaluation and documentation was performed via [employer], a HIPAA-compliant telemedicine application, in concert with a tele-presenter in the room. A face to face patient evaluation with one of my colleagues will occur once the patient is placed in an emergency department room.      DISCLAIMER: This note was prepared with [facility] voice recognition transcription software. Garbled syntax, mangled pronouns, and other bizarre constructions may be attributed to that software system.

## 2020-05-12 NOTE — DISCHARGE INSTRUCTIONS
Diagnosis:  Abscess    You had an abscess drained on your head.  An abscess is a collection of infection.  I am prescribing antibiotic medicine to help clear the rest of the infection.  Make sure to keep it clean and dry, you can shower but do not use shampoo for the next 2 days.    Please schedule a follow-up appointment with your primary care doctor within 1 week.  If you start to have new symptoms such as fever, enlarged swelling or severe headache please return to the emergency department.

## 2020-05-12 NOTE — ED PROVIDER NOTES
"Encounter Date: 5/12/2020       History     Chief Complaint   Patient presents with    Abscess     top of head , no drainage     45-year-old female with no applicable past medical history presents for a swollen cyst on the top of his head for about a week.  He denies any trauma or other inciting factor.  He has been doing hot compresses and using witch hazel without improvement.  He reports associated pain throughout his scalp and head.  Headache improved with ibuprofen.  He denies fever/chills, neck stiffness, rash, numbness/tingling/weakness or visual changes.  States that he frequently gets cysts" and boils", many of which have required draining.  He denies IVDU.        Review of patient's allergies indicates:   Allergen Reactions    Penicillins Swelling     Scrotal swelling     Past Medical History:   Diagnosis Date    UTI (urinary tract infection)      Past Surgical History:   Procedure Laterality Date    CYST REMOVAL      CYST REMOVAL       Family History   Problem Relation Age of Onset    Hypertension Mother     Cancer Father      Social History     Tobacco Use    Smoking status: Current Every Day Smoker     Packs/day: 0.50     Types: Cigarettes    Smokeless tobacco: Never Used   Substance Use Topics    Alcohol use: Yes     Comment: on weekends    Drug use: Yes     Frequency: 1.0 times per week     Types: Marijuana, Methamphetamines     Comment: Ecstacy     Review of Systems   Constitutional: Negative for fever.   HENT: Negative for sore throat.    Respiratory: Negative for shortness of breath.    Cardiovascular: Negative for chest pain.   Gastrointestinal: Negative for nausea.   Genitourinary: Negative for dysuria.   Musculoskeletal: Negative for back pain.   Skin: Negative for rash.   Neurological: Positive for headaches. Negative for dizziness, weakness and light-headedness.   Hematological: Does not bruise/bleed easily.       Physical Exam     Initial Vitals [05/12/20 1252]   BP Pulse Resp " Temp SpO2   (!) 149/98 91 18 97.6 °F (36.4 °C) 100 %      MAP       --         Physical Exam    Nursing note and vitals reviewed.  Constitutional: He appears well-developed and well-nourished. He is not diaphoretic. No distress.   HENT:   Head: Normocephalic and atraumatic.       There is a raised, 3 x 3 cm nodule on top of the head.  Exquisitely tender to palpation.  The skin is indurated no obvious area of fluctuance.  Scalp is tender to palpation.   Eyes: EOM are normal. Pupils are equal, round, and reactive to light.   Neck: Normal range of motion. Neck supple.   Cardiovascular: Normal rate, regular rhythm, normal heart sounds and intact distal pulses. Exam reveals no gallop and no friction rub.    No murmur heard.  Pulmonary/Chest: Breath sounds normal. No respiratory distress. He has no wheezes. He has no rales. He exhibits no tenderness.   Musculoskeletal: Normal range of motion.   Neurological: He is alert and oriented to person, place, and time. He has normal strength. No cranial nerve deficit or sensory deficit. GCS score is 15. GCS eye subscore is 4. GCS verbal subscore is 5. GCS motor subscore is 6.   Skin: Skin is warm and dry.   Psychiatric: He has a normal mood and affect.         ED Course   I & D - Incision and Drainage  Date/Time: 5/12/2020 4:19 PM  Performed by: Giselle Lindsay PA-C  Authorized by: Amish Mueller DO   Type: abscess  Body area: head/neck  Location details: scalp  Anesthesia: local infiltration    Anesthesia:  Local Anesthetic: lidocaine 1% with epinephrine  Anesthetic total: 2 mL  Patient sedated: no  Scalpel size: 11  Incision type: single straight  Complexity: simple  Drainage: pus and  viscous  Drainage amount: moderate  Wound treatment: incision,  wound left open and  drainage  Packing material: none  Patient tolerance: Patient tolerated the procedure well with no immediate complications        Labs Reviewed - No data to display       Imaging Results    None           Medical Decision Making:   History:   Old Medical Records: I decided to obtain old medical records.  Old Records Summarized: records from another hospital.       <> Summary of Records: The patient was seen at [facility] on 05/09/2020 for headache.  He had a CT scan performed at that time which did not show any acute abnormalities.  Initial Assessment:   45-year-old male presenting for headache and head pain with a swollen nodule on top of his head.  He is mildly hypertensive otherwise normal vitals.  He has a swollen nodule on his head  Differential Diagnosis:   Abscess  Infected sebaceous cyst  Cellulitis  Folliculitis  Clinical Tests:   Radiological Study: Reviewed  ED Management:  Will give Toradol, Tylenol for pain and do bedside ultrasound to evaluate for fluid collection.    Bedside ultrasound shows discrete fluid collection.  Will I and D abscess.    Axis incised and drained without complication.  Will discharge with Bactrim and Hibiclens.  Instructed patient to follow up with PCP. Stressed the importance of follow-up, strict ED return precautions given.  Patient voiced understanding and is comfortable with discharge. I discussed this patient with my supervising physician.                                   Clinical Impression:       ICD-10-CM ICD-9-CM   1. Abscess L02.91 682.9         Disposition:   Disposition: Discharged  Condition: Stable                        Giselle Lindsay PA-C  05/12/20 7898

## 2020-12-14 ENCOUNTER — HOSPITAL ENCOUNTER (EMERGENCY)
Facility: HOSPITAL | Age: 45
Discharge: PSYCHIATRIC OR SUBSTANCE USE FACILITY | End: 2020-12-14
Attending: EMERGENCY MEDICINE
Payer: MEDICAID

## 2020-12-14 VITALS
OXYGEN SATURATION: 99 % | RESPIRATION RATE: 16 BRPM | BODY MASS INDEX: 25.71 KG/M2 | HEART RATE: 92 BPM | TEMPERATURE: 98 F | WEIGHT: 160 LBS | HEIGHT: 66 IN | DIASTOLIC BLOOD PRESSURE: 86 MMHG | SYSTOLIC BLOOD PRESSURE: 138 MMHG

## 2020-12-14 DIAGNOSIS — R82.81 PYURIA: ICD-10-CM

## 2020-12-14 DIAGNOSIS — R44.0 AUDITORY HALLUCINATIONS: ICD-10-CM

## 2020-12-14 DIAGNOSIS — F19.951: ICD-10-CM

## 2020-12-14 DIAGNOSIS — R45.851 SUICIDAL IDEATION: Primary | ICD-10-CM

## 2020-12-14 PROBLEM — F32.3 DEPRESSIVE PSYCHOSIS: Status: ACTIVE | Noted: 2020-12-14

## 2020-12-14 LAB
ALBUMIN SERPL BCP-MCNC: 4.2 G/DL (ref 3.5–5.2)
ALP SERPL-CCNC: 96 U/L (ref 55–135)
ALT SERPL W/O P-5'-P-CCNC: 17 U/L (ref 10–44)
AMPHET+METHAMPHET UR QL: NORMAL
ANION GAP SERPL CALC-SCNC: 9 MMOL/L (ref 8–16)
APAP SERPL-MCNC: <3 UG/ML (ref 10–20)
AST SERPL-CCNC: 21 U/L (ref 10–40)
BARBITURATES UR QL SCN>200 NG/ML: NEGATIVE
BASOPHILS # BLD AUTO: 0.05 K/UL (ref 0–0.2)
BASOPHILS NFR BLD: 0.6 % (ref 0–1.9)
BENZODIAZ UR QL SCN>200 NG/ML: NEGATIVE
BILIRUB SERPL-MCNC: 0.5 MG/DL (ref 0.1–1)
BILIRUB UR QL STRIP: NEGATIVE
BUN SERPL-MCNC: 8 MG/DL (ref 6–20)
BZE UR QL SCN: NEGATIVE
CALCIUM SERPL-MCNC: 9.2 MG/DL (ref 8.7–10.5)
CANNABINOIDS UR QL SCN: NORMAL
CHLORIDE SERPL-SCNC: 107 MMOL/L (ref 95–110)
CLARITY UR REFRACT.AUTO: CLEAR
CO2 SERPL-SCNC: 25 MMOL/L (ref 23–29)
COLOR UR AUTO: ABNORMAL
CREAT SERPL-MCNC: 1 MG/DL (ref 0.5–1.4)
CREAT UR-MCNC: 66 MG/DL (ref 23–375)
CTP QC/QA: YES
DIFFERENTIAL METHOD: ABNORMAL
EOSINOPHIL # BLD AUTO: 0.1 K/UL (ref 0–0.5)
EOSINOPHIL NFR BLD: 1.7 % (ref 0–8)
ERYTHROCYTE [DISTWIDTH] IN BLOOD BY AUTOMATED COUNT: 13.9 % (ref 11.5–14.5)
EST. GFR  (AFRICAN AMERICAN): >60 ML/MIN/1.73 M^2
EST. GFR  (NON AFRICAN AMERICAN): >60 ML/MIN/1.73 M^2
ETHANOL SERPL-MCNC: <10 MG/DL
GLUCOSE SERPL-MCNC: 67 MG/DL (ref 70–110)
GLUCOSE UR QL STRIP: NEGATIVE
HCT VFR BLD AUTO: 47 % (ref 40–54)
HGB BLD-MCNC: 16.2 G/DL (ref 14–18)
HGB UR QL STRIP: ABNORMAL
IMM GRANULOCYTES # BLD AUTO: 0.01 K/UL (ref 0–0.04)
IMM GRANULOCYTES NFR BLD AUTO: 0.1 % (ref 0–0.5)
KETONES UR QL STRIP: NEGATIVE
LEUKOCYTE ESTERASE UR QL STRIP: ABNORMAL
LYMPHOCYTES # BLD AUTO: 2.4 K/UL (ref 1–4.8)
LYMPHOCYTES NFR BLD: 29.4 % (ref 18–48)
MCH RBC QN AUTO: 31.6 PG (ref 27–31)
MCHC RBC AUTO-ENTMCNC: 34.5 G/DL (ref 32–36)
MCV RBC AUTO: 92 FL (ref 82–98)
METHADONE UR QL SCN>300 NG/ML: NEGATIVE
MICROSCOPIC COMMENT: ABNORMAL
MONOCYTES # BLD AUTO: 0.6 K/UL (ref 0.3–1)
MONOCYTES NFR BLD: 7.5 % (ref 4–15)
NEUTROPHILS # BLD AUTO: 5 K/UL (ref 1.8–7.7)
NEUTROPHILS NFR BLD: 60.7 % (ref 38–73)
NITRITE UR QL STRIP: NEGATIVE
NRBC BLD-RTO: 0 /100 WBC
OPIATES UR QL SCN: NEGATIVE
PCP UR QL SCN>25 NG/ML: NEGATIVE
PH UR STRIP: 6 [PH] (ref 5–8)
PLATELET # BLD AUTO: 228 K/UL (ref 150–350)
PLATELET BLD QL SMEAR: ABNORMAL
PMV BLD AUTO: 11.7 FL (ref 9.2–12.9)
POCT GLUCOSE: 185 MG/DL (ref 70–110)
POTASSIUM SERPL-SCNC: 4.4 MMOL/L (ref 3.5–5.1)
PROT SERPL-MCNC: 7.4 G/DL (ref 6–8.4)
PROT UR QL STRIP: NEGATIVE
RBC # BLD AUTO: 5.12 M/UL (ref 4.6–6.2)
RBC #/AREA URNS AUTO: 1 /HPF (ref 0–4)
SARS-COV-2 RDRP RESP QL NAA+PROBE: NEGATIVE
SODIUM SERPL-SCNC: 141 MMOL/L (ref 136–145)
SP GR UR STRIP: 1.01 (ref 1–1.03)
SQUAMOUS #/AREA URNS AUTO: 0 /HPF
TOXICOLOGY INFORMATION: NORMAL
TSH SERPL DL<=0.005 MIU/L-ACNC: 0.83 UIU/ML (ref 0.4–4)
URN SPEC COLLECT METH UR: ABNORMAL
WBC # BLD AUTO: 8.24 K/UL (ref 3.9–12.7)
WBC #/AREA URNS AUTO: 31 /HPF (ref 0–5)

## 2020-12-14 PROCEDURE — 80329 ANALGESICS NON-OPIOID 1 OR 2: CPT

## 2020-12-14 PROCEDURE — 87086 URINE CULTURE/COLONY COUNT: CPT

## 2020-12-14 PROCEDURE — 82962 GLUCOSE BLOOD TEST: CPT

## 2020-12-14 PROCEDURE — 80053 COMPREHEN METABOLIC PANEL: CPT

## 2020-12-14 PROCEDURE — U0002 COVID-19 LAB TEST NON-CDC: HCPCS | Performed by: EMERGENCY MEDICINE

## 2020-12-14 PROCEDURE — 99285 EMERGENCY DEPT VISIT HI MDM: CPT | Mod: ,,, | Performed by: EMERGENCY MEDICINE

## 2020-12-14 PROCEDURE — 99285 PR EMERGENCY DEPT VISIT,LEVEL V: ICD-10-PCS | Mod: ,,, | Performed by: EMERGENCY MEDICINE

## 2020-12-14 PROCEDURE — 84443 ASSAY THYROID STIM HORMONE: CPT

## 2020-12-14 PROCEDURE — 81001 URINALYSIS AUTO W/SCOPE: CPT

## 2020-12-14 PROCEDURE — 99285 EMERGENCY DEPT VISIT HI MDM: CPT | Mod: 25

## 2020-12-14 PROCEDURE — 80307 DRUG TEST PRSMV CHEM ANLYZR: CPT

## 2020-12-14 PROCEDURE — 80320 DRUG SCREEN QUANTALCOHOLS: CPT

## 2020-12-14 PROCEDURE — 85025 COMPLETE CBC W/AUTO DIFF WBC: CPT

## 2020-12-14 RX ORDER — SULFAMETHOXAZOLE AND TRIMETHOPRIM 800; 160 MG/1; MG/1
1 TABLET ORAL 2 TIMES DAILY
Qty: 14 TABLET | Refills: 0 | Status: ON HOLD | OUTPATIENT
Start: 2020-12-14 | End: 2020-12-21 | Stop reason: HOSPADM

## 2020-12-14 NOTE — ED NOTES
Pt sleeping comfortably and independently repositioned in stretcher with bed locked in lowest position for safety and in paper scrubs.  Sitter at bedside maintaining 1:1 supervision and charting q15. NAD noted at this time. All hazardous items removed from the room. Respirations even and unlabored and visible chest rise noted.  Patient offered bathroom assistance and denies need at this time with a urinal bedside. Pt instructed to call if assistance is needed. No needs at this time. Will continue to monitor.

## 2020-12-14 NOTE — ED PROVIDER NOTES
Encounter Date: 12/14/2020       History     Chief Complaint   Patient presents with    Suicidal     hearing things, hands vibrating     45-year-old male with a psychiatric history presents with auditory hallucinations and suicidal ideations.  Patient brought himself to the emergency department because of these symptoms.  Reports hearing sounds and voices.  He reports that he wants to kill himself by taking a lot pills going to sleep.  Reports there is a string and his body that wraps around is head and goes into his abdomen.  He has tried scratching his abdomen to pull out the string.  He thinks the string of his body is vibrating his legs.  Patient mentions that last week he was around some people that called him crazy.  He had thoughts of hurting them after that incident.  He reports smoking marijuana daily and using ecstasy frequently.  Last time he used ecstasy was on 12/13/2020.  He has trouble sleeping at night and ends up staying awake during the night.  So he likes to take ecstasy so he can stay up during the day so he can try to go to sleep during the night.    Patient has had previous hospitalizations for psychiatric symptoms. He was hospitalized for delusions on 09/09/2019 and was treated for amphetamine induced psychosis.  He was treated with Risperdal.              Review of patient's allergies indicates:   Allergen Reactions    Penicillins Swelling     Scrotal swelling     Past Medical History:   Diagnosis Date    Depression     Hallucinations of tactile sensation     Suicide ideation     UTI (urinary tract infection)      Past Surgical History:   Procedure Laterality Date    CYST REMOVAL      CYST REMOVAL       Family History   Problem Relation Age of Onset    Hypertension Mother     Cancer Father      Social History     Tobacco Use    Smoking status: Current Every Day Smoker     Packs/day: 0.50     Types: Cigarettes    Smokeless tobacco: Never Used   Substance Use Topics    Alcohol use:  Yes     Comment: on weekends    Drug use: Yes     Frequency: 1.0 times per week     Types: Marijuana, Methamphetamines     Comment: Ecstacy     Review of Systems   Constitutional: Negative for chills and fever.   HENT: Negative for rhinorrhea and sore throat.    Eyes: Negative for photophobia and visual disturbance.   Respiratory: Negative for cough and shortness of breath.    Cardiovascular: Negative for chest pain and leg swelling.   Gastrointestinal: Negative for abdominal pain, constipation, diarrhea, nausea and vomiting.   Genitourinary: Negative for difficulty urinating and dysuria.   Musculoskeletal: Negative for arthralgias and back pain.   Skin: Positive for wound (Multiple excoriations on his abdomen). Negative for rash.   Neurological: Negative for dizziness and light-headedness.   Psychiatric/Behavioral: Positive for hallucinations and suicidal ideas.       Physical Exam     Initial Vitals [12/14/20 1135]   BP Pulse Resp Temp SpO2   (!) 133/111 103 18 97.8 °F (36.6 °C) 99 %      MAP       --         Physical Exam    Constitutional: He is not diaphoretic. He does not have a sickly appearance.   HENT:   Head: Normocephalic.   Nose: Nose normal.   Mouth/Throat: Mucous membranes are dry. No oropharyngeal exudate.   Multiple scars on his face   Eyes: EOM are normal. Right eye exhibits no discharge. Left eye exhibits no discharge.   Cardiovascular: Regular rhythm and intact distal pulses. Tachycardia present.    No murmur heard.  Pulmonary/Chest: Breath sounds normal. No respiratory distress. He has no wheezes. He has no rales.   Abdominal: Soft. He exhibits no distension. There is no abdominal tenderness.   Multiple excoriations on abdomen, which patient says is from scratching   Musculoskeletal: No tenderness or edema.   Neurological: He is alert and oriented to person, place, and time.   Skin: Skin is warm and dry.   Psychiatric: His speech is normal and behavior is normal. His mood appears anxious. He is  actively hallucinating. He exhibits a depressed mood.   Reports auditory hallucinations.  He is tearful during the interview.  Has a anxious and depressed mood.  Speech is normal and appropriate further questions asked.  His behavior is normal but he can become anxious when asked certain questions.  He has suicidal ideations and homicidal ideations.         ED Course   Procedures  Labs Reviewed   CBC W/ AUTO DIFFERENTIAL - Abnormal; Notable for the following components:       Result Value    MCH 31.6 (*)     All other components within normal limits   COMPREHENSIVE METABOLIC PANEL - Abnormal; Notable for the following components:    Glucose 67 (*)     All other components within normal limits   URINALYSIS, REFLEX TO URINE CULTURE - Abnormal; Notable for the following components:    Occult Blood UA 1+ (*)     Leukocytes, UA 3+ (*)     All other components within normal limits    Narrative:     Specimen Source->Urine   ACETAMINOPHEN LEVEL - Abnormal; Notable for the following components:    Acetaminophen (Tylenol), Serum <3.0 (*)     All other components within normal limits   URINALYSIS MICROSCOPIC - Abnormal; Notable for the following components:    WBC, UA 31 (*)     All other components within normal limits    Narrative:     Specimen Source->Urine   POCT GLUCOSE - Abnormal; Notable for the following components:    POCT Glucose 185 (*)     All other components within normal limits   CULTURE, URINE   TSH   DRUG SCREEN PANEL, URINE EMERGENCY    Narrative:     Specimen Source->Urine   ALCOHOL,MEDICAL (ETHANOL)   SARS-COV-2 RDRP GENE    Narrative:     This test utilizes isothermal nucleic acid amplification   technology to detect the SARS-CoV-2 RdRp nucleic acid segment.   The analytical sensitivity (limit of detection) is 125 genome   equivalents/mL.   A POSITIVE result implies infection with the SARS-CoV-2 virus;   the patient is presumed to be contagious.     A NEGATIVE result means that SARS-CoV-2 nucleic acids are  not   present above the limit of detection. A NEGATIVE result should be   treated as presumptive. It does not rule out the possibility of   COVID-19 and should not be the sole basis for treatment decisions.   If COVID-19 is strongly suspected based on clinical and exposure   history, re-testing using an alternate molecular assay should be   considered.   This test is only for use under the Food and Drug   Administration s Emergency Use Authorization (EUA).   Commercial kits are provided by [employer].   Performance characteristics of the EUA have been independently   verified by [facility] Department of   Pathology and Laboratory Medicine.   _________________________________________________________________   The authorized Fact Sheet for Healthcare Providers and the authorized Fact   Sheet for Patients of the ID NOW COVID-19 are available on the FDA   website:     https://www.fda.gov/media/599078/download  https://www.fda.gov/media/233454/download                Imaging Results    None          Medical Decision Making:   History:   Old Medical Records: I decided to obtain old medical records.  Old Records Summarized: records from previous admission(s).       <> Summary of Records: Previous hospitalization for drug-induced psychosis.  Initial Assessment:   45-year-old male presents with to auditory hallucinations and suicidal ideations.  Reports smoking marijuana daily and using ecstasy frequently.  Last used ecstasy on 12/13/2020.  Differential Diagnosis:   Drug-induced psychosis, paranoia, delusions, unspecified psychosis, schizophrenia  Clinical Tests:   Lab Tests: Ordered and Reviewed  ED Management:  Patient PEC'd.  CBC, CMP, TSH, and glucose within normal limits.  COVID negative.  U tox presumptive positive for marijuana and amphetamine.  Patient's history and previous hospitalization makes me think this is possibly due to drug-induced psychosis. Patient is medically cleared be transferred  psych facility. Patient to be sent to psych facility.  UA was significant for UTI.  Prescribed Bactrim for UTI.              Attending Attestation:   Physician Attestation Statement for Resident:  As the supervising MD   Physician Attestation Statement: I have personally seen and examined this patient.   I agree with the above history. -:   As the supervising MD I agree with the above PE.    As the supervising MD I agree with the above treatment, course, plan, and disposition.   -: Psychosis, likely secondary to amphetamine usage.  Medically stable.  Will transfer.                    ED Course as of Dec 15 0705   Mon Dec 14, 2020   1235 SARS-CoV-2 RNA, Amplification, Qual: Negative [DC]   1305 WBC: 8.24 [DC]   1305 Hemoglobin: 16.2 [DC]   1305 Platelets: 228 [DC]   1328 Glucose(!): 67 [DC]   1328 Acetaminophen (Tylenol), Serum(!): <3.0 [DC]   1328 Alcohol, Serum: <10 [DC]   1328 WBC: 8.24 [DC]   1329 Hemoglobin: 16.2 [DC]   1329 Platelets: 228 [DC]      ED Course User Index  [DC] Mg Aburto MD       Medically cleared for psychiatry placement: 12/14/2020  1:56 PM                Clinical Impression:     ICD-10-CM ICD-9-CM   1. Suicidal ideation  R45.851 V62.84   2. Auditory hallucinations  R44.0 780.1   3. Drug psychosis with hallucinations  F19.951 292.12   4. Pyuria  R82.81 791.9                          ED Disposition Condition    Transfer to Psych Facility         ED Prescriptions     Medication Sig Dispense Start Date End Date Auth. Provider    sulfamethoxazole-trimethoprim 800-160mg (BACTRIM DS) 800-160 mg Tab Take 1 tablet by mouth 2 (two) times daily. for 7 days 14 tablet 12/14/2020 12/21/2020 Mg Aburto MD        Follow-up Information    None                                      Javier Barajas MD  Resident  12/14/20 [id number]       Mg Aburto MD  12/15/20 0705

## 2020-12-14 NOTE — ED NOTES
Pt resting comfortably and independently repositioned in stretcher with bed locked in lowest position for safety and in paper scrubs.  Sitter at bedside maintaining 1:1 supervision and charting q15. NAD noted at this time. All hazardous items removed from the room.  Pt provided with sandwich and juice. Respirations even and unlabored and visible chest rise noted.  Patient offered bathroom assistance and denies need at this time with a urinal bedside. Pt instructed to call if assistance is needed. No needs at this time. Will continue to monitor.

## 2020-12-14 NOTE — ED TRIAGE NOTES
"Manan Granda, a 45 y.o. male presents to the ED w/ complaint of tactile and auditory hallucinations with SI and states he has been to several facilities for it.Pt states "my hands and legs are vibrating and I hear noises in my room". "Everyone thinks I'm crazy". Pt reports insomnia and "smokes weed" to sleep and uses ectasy frequently. Pt has generalized scabs over body.     Triage note:  Chief Complaint   Patient presents with    Suicidal     hearing things, hands vibrating     Review of patient's allergies indicates:   Allergen Reactions    Penicillins Swelling     Scrotal swelling     Past Medical History:   Diagnosis Date    UTI (urinary tract infection)        "

## 2020-12-14 NOTE — ED NOTES
Pt escorted to the [facility] transportation vehicle by [facility] staff. [facility] staff given PEC and 1 bag of pt belongings. Pt remained calm and cooperative for the transfer.

## 2020-12-14 NOTE — ED NOTES
Report called to Sarah at [facility]. Pt offered the phone to call family and friends to notify them of his disposition. Pt refused at this time.

## 2020-12-14 NOTE — ED NOTES
Pt transferred to  1, he is calm and cooperative. Pt checked for contraband and wearing unit attire. PEC called into the coroners office, pt belongings secured. Pt endorse SI's without a specific plan. Pt denies HI/AVH's but does have delusional thoughts process. Pt instructed on the PEC process he verbalizes understanding. Pt lying in bed resting NAD noted, DVC maintained.

## 2020-12-15 PROBLEM — N30.00 ACUTE CYSTITIS WITHOUT HEMATURIA: Status: ACTIVE | Noted: 2020-12-15

## 2020-12-15 PROBLEM — Z13.9 ENCOUNTER FOR MEDICAL SCREENING EXAMINATION: Status: ACTIVE | Noted: 2020-12-15

## 2020-12-15 LAB — BACTERIA UR CULT: NO GROWTH

## 2021-03-22 PROBLEM — Z13.9 ENCOUNTER FOR MEDICAL SCREENING EXAMINATION: Status: RESOLVED | Noted: 2020-12-15 | Resolved: 2021-03-22

## 2021-08-01 ENCOUNTER — HOSPITAL ENCOUNTER (EMERGENCY)
Facility: HOSPITAL | Age: 46
Discharge: PSYCHIATRIC OR SUBSTANCE USE FACILITY | End: 2021-08-01
Attending: EMERGENCY MEDICINE
Payer: MEDICAID

## 2021-08-01 VITALS
BODY MASS INDEX: 24.11 KG/M2 | HEIGHT: 66 IN | HEART RATE: 82 BPM | DIASTOLIC BLOOD PRESSURE: 82 MMHG | RESPIRATION RATE: 17 BRPM | TEMPERATURE: 98 F | SYSTOLIC BLOOD PRESSURE: 154 MMHG | WEIGHT: 150 LBS | OXYGEN SATURATION: 100 %

## 2021-08-01 DIAGNOSIS — R44.3 HALLUCINATION: Primary | ICD-10-CM

## 2021-08-01 DIAGNOSIS — F29 PSYCHOSIS, UNSPECIFIED PSYCHOSIS TYPE: ICD-10-CM

## 2021-08-01 LAB
ALBUMIN SERPL BCP-MCNC: 4.4 G/DL (ref 3.5–5.2)
ALP SERPL-CCNC: 108 U/L (ref 55–135)
ALT SERPL W/O P-5'-P-CCNC: 13 U/L (ref 10–44)
AMPHET+METHAMPHET UR QL: ABNORMAL
ANION GAP SERPL CALC-SCNC: 10 MMOL/L (ref 8–16)
APAP SERPL-MCNC: <3 UG/ML (ref 10–20)
AST SERPL-CCNC: 19 U/L (ref 10–40)
BACTERIA #/AREA URNS AUTO: ABNORMAL /HPF
BARBITURATES UR QL SCN>200 NG/ML: NEGATIVE
BASOPHILS # BLD AUTO: ABNORMAL K/UL (ref 0–0.2)
BASOPHILS NFR BLD: 0 % (ref 0–1.9)
BENZODIAZ UR QL SCN>200 NG/ML: NEGATIVE
BILIRUB SERPL-MCNC: 0.8 MG/DL (ref 0.1–1)
BILIRUB UR QL STRIP: NEGATIVE
BUN SERPL-MCNC: 8 MG/DL (ref 6–20)
BZE UR QL SCN: NEGATIVE
CALCIUM SERPL-MCNC: 10.5 MG/DL (ref 8.7–10.5)
CANNABINOIDS UR QL SCN: ABNORMAL
CHLORIDE SERPL-SCNC: 105 MMOL/L (ref 95–110)
CLARITY UR REFRACT.AUTO: ABNORMAL
CO2 SERPL-SCNC: 25 MMOL/L (ref 23–29)
COLOR UR AUTO: YELLOW
CREAT SERPL-MCNC: 1.2 MG/DL (ref 0.5–1.4)
CREAT UR-MCNC: 298 MG/DL (ref 23–375)
CTP QC/QA: YES
DACRYOCYTES BLD QL SMEAR: ABNORMAL
DIFFERENTIAL METHOD: ABNORMAL
EOSINOPHIL # BLD AUTO: ABNORMAL K/UL (ref 0–0.5)
EOSINOPHIL NFR BLD: 1 % (ref 0–8)
ERYTHROCYTE [DISTWIDTH] IN BLOOD BY AUTOMATED COUNT: 14.2 % (ref 11.5–14.5)
EST. GFR  (AFRICAN AMERICAN): >60 ML/MIN/1.73 M^2
EST. GFR  (NON AFRICAN AMERICAN): >60 ML/MIN/1.73 M^2
ETHANOL SERPL-MCNC: <10 MG/DL
GIANT PLATELETS BLD QL SMEAR: PRESENT
GLUCOSE SERPL-MCNC: 89 MG/DL (ref 70–110)
GLUCOSE UR QL STRIP: NEGATIVE
HCT VFR BLD AUTO: 45.2 % (ref 40–54)
HGB BLD-MCNC: 16.1 G/DL (ref 14–18)
HGB UR QL STRIP: ABNORMAL
HYALINE CASTS UR QL AUTO: 0 /LPF
IMM GRANULOCYTES # BLD AUTO: ABNORMAL K/UL (ref 0–0.04)
IMM GRANULOCYTES NFR BLD AUTO: ABNORMAL % (ref 0–0.5)
KETONES UR QL STRIP: NEGATIVE
LEUKOCYTE ESTERASE UR QL STRIP: NEGATIVE
LYMPHOCYTES # BLD AUTO: ABNORMAL K/UL (ref 1–4.8)
LYMPHOCYTES NFR BLD: 21 % (ref 18–48)
MCH RBC QN AUTO: 31.9 PG (ref 27–31)
MCHC RBC AUTO-ENTMCNC: 35.6 G/DL (ref 32–36)
MCV RBC AUTO: 90 FL (ref 82–98)
METAMYELOCYTES NFR BLD MANUAL: 1 %
METHADONE UR QL SCN>300 NG/ML: NEGATIVE
MICROSCOPIC COMMENT: ABNORMAL
MONOCYTES # BLD AUTO: ABNORMAL K/UL (ref 0.3–1)
MONOCYTES NFR BLD: 7 % (ref 4–15)
NEUTROPHILS NFR BLD: 70 % (ref 38–73)
NITRITE UR QL STRIP: NEGATIVE
NRBC BLD-RTO: 0 /100 WBC
OPIATES UR QL SCN: NEGATIVE
PCP UR QL SCN>25 NG/ML: NEGATIVE
PH UR STRIP: 5 [PH] (ref 5–8)
PLATELET # BLD AUTO: 239 K/UL (ref 150–450)
PLATELET BLD QL SMEAR: ABNORMAL
PMV BLD AUTO: 11.2 FL (ref 9.2–12.9)
POTASSIUM SERPL-SCNC: 3.6 MMOL/L (ref 3.5–5.1)
PROT SERPL-MCNC: 8 G/DL (ref 6–8.4)
PROT UR QL STRIP: ABNORMAL
RBC # BLD AUTO: 5.05 M/UL (ref 4.6–6.2)
RBC #/AREA URNS AUTO: 1 /HPF (ref 0–4)
SARS-COV-2 RDRP RESP QL NAA+PROBE: NEGATIVE
SCHISTOCYTES BLD QL SMEAR: PRESENT
SODIUM SERPL-SCNC: 140 MMOL/L (ref 136–145)
SP GR UR STRIP: 1.01 (ref 1–1.03)
SQUAMOUS #/AREA URNS AUTO: 0 /HPF
TOXICOLOGY INFORMATION: ABNORMAL
TSH SERPL DL<=0.005 MIU/L-ACNC: 1.22 UIU/ML (ref 0.4–4)
URN SPEC COLLECT METH UR: ABNORMAL
WBC # BLD AUTO: 10.64 K/UL (ref 3.9–12.7)
WBC #/AREA URNS AUTO: 8 /HPF (ref 0–5)

## 2021-08-01 PROCEDURE — 84443 ASSAY THYROID STIM HORMONE: CPT | Performed by: EMERGENCY MEDICINE

## 2021-08-01 PROCEDURE — 81001 URINALYSIS AUTO W/SCOPE: CPT | Performed by: EMERGENCY MEDICINE

## 2021-08-01 PROCEDURE — 87389 HIV-1 AG W/HIV-1&-2 AB AG IA: CPT | Performed by: EMERGENCY MEDICINE

## 2021-08-01 PROCEDURE — 99285 PR EMERGENCY DEPT VISIT,LEVEL V: ICD-10-PCS | Mod: CS,,, | Performed by: EMERGENCY MEDICINE

## 2021-08-01 PROCEDURE — 99285 EMERGENCY DEPT VISIT HI MDM: CPT | Mod: CS,,, | Performed by: EMERGENCY MEDICINE

## 2021-08-01 PROCEDURE — 93005 ELECTROCARDIOGRAM TRACING: CPT

## 2021-08-01 PROCEDURE — 80307 DRUG TEST PRSMV CHEM ANLYZR: CPT | Performed by: EMERGENCY MEDICINE

## 2021-08-01 PROCEDURE — 93010 ELECTROCARDIOGRAM REPORT: CPT | Mod: ,,, | Performed by: INTERNAL MEDICINE

## 2021-08-01 PROCEDURE — 85027 COMPLETE CBC AUTOMATED: CPT | Performed by: EMERGENCY MEDICINE

## 2021-08-01 PROCEDURE — 99285 EMERGENCY DEPT VISIT HI MDM: CPT | Mod: 25

## 2021-08-01 PROCEDURE — 85007 BL SMEAR W/DIFF WBC COUNT: CPT | Performed by: EMERGENCY MEDICINE

## 2021-08-01 PROCEDURE — 80053 COMPREHEN METABOLIC PANEL: CPT | Performed by: EMERGENCY MEDICINE

## 2021-08-01 PROCEDURE — 80143 DRUG ASSAY ACETAMINOPHEN: CPT | Performed by: EMERGENCY MEDICINE

## 2021-08-01 PROCEDURE — 93010 EKG 12-LEAD: ICD-10-PCS | Mod: ,,, | Performed by: INTERNAL MEDICINE

## 2021-08-01 PROCEDURE — 82077 ASSAY SPEC XCP UR&BREATH IA: CPT | Performed by: EMERGENCY MEDICINE

## 2021-08-01 PROCEDURE — U0002 COVID-19 LAB TEST NON-CDC: HCPCS | Performed by: EMERGENCY MEDICINE

## 2021-08-01 RX ORDER — LOSARTAN POTASSIUM 50 MG/1
50 TABLET ORAL DAILY
COMMUNITY
Start: 2021-05-06

## 2021-08-02 LAB — HIV 1+2 AB+HIV1 P24 AG SERPL QL IA: NEGATIVE
